# Patient Record
Sex: MALE | Race: WHITE | Employment: FULL TIME
[De-identification: names, ages, dates, MRNs, and addresses within clinical notes are randomized per-mention and may not be internally consistent; named-entity substitution may affect disease eponyms.]

---

## 2021-09-09 ENCOUNTER — TRANSCRIBE ORDERS (OUTPATIENT)
Dept: OTHER | Age: 46
End: 2021-09-09

## 2021-09-09 DIAGNOSIS — Z84.81 FHX: BRCA GENE POSITIVE: Primary | ICD-10-CM

## 2021-09-15 NOTE — PROGRESS NOTES
Action    Action Taken 9/15/2021 2:19pm LUCRECIA     I called pt Shan - his phone went to . I pulled his records into [facility].

## 2021-10-28 ENCOUNTER — TELEPHONE (OUTPATIENT)
Dept: ONCOLOGY | Facility: CLINIC | Age: 46
End: 2021-10-28

## 2021-10-28 NOTE — TELEPHONE ENCOUNTER
10/28/2021    I called Shan to ask about the familial BRCA mutation. Given that this was his referral, I called to ask if he could send me a copy of this relatives genetic testing report prior to his appointment tomorrow. He said that he had a stack of information available and that he would try to send me a copy of that later tonight.    He also asked if he needed a referral for the appointment. I informed him that technically he does not, however, some insurances may cover our appointment better if a referral is placed. I asked him to contact his PCP about a referral if he was interested in that. I will also contact the schedulers to see if there is anything else I can assist with on my end and for them to keep a look out for that referral to apply it to our visit. He had no further questions at this time.    Josh Reyna MS, [facility]  Licensed, Certified Genetic Counselor

## 2021-10-29 ENCOUNTER — VIRTUAL VISIT (OUTPATIENT)
Dept: ONCOLOGY | Facility: CLINIC | Age: 46
End: 2021-10-29
Attending: GENETIC COUNSELOR, MS
Payer: COMMERCIAL

## 2021-10-29 ENCOUNTER — PRE VISIT (OUTPATIENT)
Dept: ONCOLOGY | Facility: CLINIC | Age: 46
End: 2021-10-29

## 2021-10-29 DIAGNOSIS — Z80.42 FAMILY HISTORY OF MALIGNANT NEOPLASM OF PROSTATE: ICD-10-CM

## 2021-10-29 DIAGNOSIS — Z80.41 FAMILY HISTORY OF MALIGNANT NEOPLASM OF OVARY: ICD-10-CM

## 2021-10-29 DIAGNOSIS — Z80.3 FAMILY HISTORY OF MALIGNANT NEOPLASM OF BREAST: Primary | ICD-10-CM

## 2021-10-29 DIAGNOSIS — Z84.81 FHX: BRCA GENE POSITIVE: ICD-10-CM

## 2021-10-29 PROCEDURE — 96040 HC GENETIC COUNSELING, EACH 30 MINUTES: CPT | Mod: TEL,95 | Performed by: GENETIC COUNSELOR, MS

## 2021-10-29 NOTE — PROGRESS NOTES
10/29/2021    Referring Provider: Self-referred    Presenting Information:   I met with Shan for his phone genetic counseling visit, through the Cancer Risk Management Program, to discuss his family history of breast, ovarian, and prostate cancer along with the known BRCA2 mutation. Today we reviewed this history, cancer screening recommendations, and available genetic testing options.    Personal History:  Shan is a 46 year old year old male. He does not have any personal history of cancer. Shan has not had a colonoscopy. He does not regularly do any other cancer screening at this time.     Family History: (Please see scanned pedigree for detailed family history information)    Shan's father has a history of skin cancer on his face. He was also found to carry the familial BRCA2 mutation. Specifically, this mutation is [id number].    Shan's sister does not have cancer but she was found to have the familial BRCA2 mutation.    Her daughter (Shan's niece) does not have cancer but has the familial BRCA2 mutation.    Shan's mother was diagnosed with breast cancer in her 50's. She had genetic testing and was negative for 91 genes. A copy of this report was provided at our visit.    Shan's maternal grandmother  from an unknown cancer in her early 30's.    Shan's maternal grandfather was diagnosed with prostate cancer at an unknown age.    A paternal aunt was diagnosed with breast cancer at an unknown age.    A paternal cousin was diagnosed with breast cancer at age 31 and  at age 33. She was the first person identified to have the familial BRCA2 mutation in the family.    Her sister was diagnosed with an unknown cancer and also has the familial BRCA2 mutation.    Shan's paternal grandmother was diagnosed with breast cancer at age 59 and  at age 82.    Her sister (Shan's great aunt) was diagnosed with breast cancer in her 60's and  in her 80's.    His maternal ethnicity is Turkmen  and [place]. His paternal ethnicity is Occitan and Italian. There is no known Ashkenazi Pentecostal ancestry on either side of his family. There is no reported consanguinity.    Discussion:    We discussed the natural history and genetics of hereditary cancer. A detailed handout regarding hereditary cancer, along with the other information we discussed, will be mailed to Shan at the end of our appointment today and can be found in the after visit summary. Topics included: inheritance pattern, cancer risks, cancer screening recommendations, and also risks, benefits and limitations of testing.    Based on Shan's father's genetic test result, Shan has a 50% chance of also carrying the same genetic change in the BRCA2 gene.    Based on this, Shan meets the National Comprehensive Cancer Network (NCCN) criteria for genetic testing of the familial BRCA2 mutation.      Genetic testing is available for: BRCA2 site-specific testing for the known familial mutation [id number] or panel testing.    Given that his mother had negative comprehensive genetic testing, it is unlikely that there is a currently identifiable mutation in a cancer susceptibility gene from his maternal side of the family. Given that, Shan elected to pursue site specific genetic testing for the familial BRCA2 mutation.    Shan stated that he would prefer to submit a saliva kit for his genetic testing. [employer] will send a kit directly to his home with directions on how to collect a saliva sample. We discussed that there is a small chance for sample failure due to contamination of the sample. To help minimize this, he should follow the directions that are sent with the kit. Shan verbalized understanding of this. Once the sample is collected, he will send it to [employer] using the return envelope and prepaid shipping label.     Verbal consent was given over phone and written on the consent form. Turnaround time is approximately 4 weeks  once the lab receives the sample.    Medical Management: For Shan, we reviewed that the information from genetic testing may determine:    additional cancer screening for which Shan may qualify (i.e. increased breast and prostate screening, more frequent dermatologic exams, etc.),    and targeted chemotherapies for Shan he were to develop certain cancers in the future (i.e. PARP inhibitors, etc.).      These recommendations will be discussed in detail once genetic testing is completed.    Plan:  1) Today Shan elected to proceed with BRCA2 site-specific testing through [employer].  2) A copy of the consent form and the after visit summary will be mailed to Shan.  3) This information should be available in approximately 4 weeks, once the lab receives the sample.  4) I will call Shan with the results once they become available.    Time spent on the phone: 40 minutes    Josh Reyna MS, [facility] [place] [facility]  Licensed, Certified Genetic Counselor  (P): [phone]  (F): [phone]

## 2021-10-29 NOTE — Clinical Note
10/29/2021         RE: Shan Mas  [address] [place] [address]        Dear Colleague,    Thank you for referring your patient, Shan Mas, to the [facility]. Please see a copy of my visit note below.    10/29/2021    Referring Provider: ***    Presenting Information:   I met with Shan for his video genetic counseling visit, through the Cancer Risk Management Program, to discuss his personal *** and family history of *** cancer. Today we reviewed this history, cancer screening recommendations, and available genetic testing options.    Personal History:  Shan is a 46 year old year old male. He was diagnosed with ***; treatment included ***  / does not have any personal history of cancer.    ***She had her first menstrual period at age ***, her first child at age ***, and is ***.  ***Shan has her ovaries, fallopian tubes and uterus in place, and she has had *** ovarian cancer screening to date. She reports that she *** hormone replacement therapy.      She has *** clinical breast exams and mammograms; her most recent mammogram in *** was ***. ***Shan began having colonoscopies at the age of ***/Shan has not had a colonoscopy. His most recent colonoscopy in *** was *** and follow-up was recommended in ***. ***He does not regularly do any other cancer screening at this time. Shan reported *** tobacco use and *** alcohol use.    Family History: (Please see scanned pedigree for detailed family history information)    ***    ***    His maternal ethnicity is ***. His paternal ethnicity is ***. There is no known Ashkenazi Voodoo ancestry on either side of his family. There is no reported consanguinity.    Discussion:    Shan's personal and family history of *** is suggestive of a hereditary cancer syndrome.    We reviewed the features of sporadic, familial, and hereditary cancers. ***    We discussed the natural history and genetics of hereditary cancer. A detailed  handout regarding hereditary cancer, along with the other information we discussed, will be mailed to Shan at the end of our appointment today and can be found in the after visit summary. Topics included: inheritance pattern, cancer risks, cancer screening recommendations, and also risks, benefits and limitations of testing.    Based on his personal and family history, Shan meets current National Comprehensive Cancer Network (NCCN) criteria for genetic testing of ***.    We discussed that there are additional genes that could cause increased risk for *** cancer. As many of these genes present with overlapping features in a family and accurate cancer risk cannot always be established based upon the pedigree analysis alone, it would be reasonable for Shan to consider panel genetic testing to analyze multiple genes at once.    ***    Shan stated that she would prefer to submit a saliva kit for her genetic testing. ***[employer] will send a kit directly to his home with directions on how to collect a saliva sample. We discussed that there is a small chance for sample failure due to contamination of the sample. To help minimize this, he should follow the directions that are sent with the kit. Shan verbalized understanding of this. Once the sample is collected, he will send it to ***[employer] using the return envelope and prepaid shipping label.     Verbal consent was given over video*** and written on the consent form due to COVID-19 restrictions. Turnaround time is approximately 4 weeks once the lab receives the sample.    Medical Management: For Shan, we reviewed that the information from genetic testing may determine:    ***surgery to treat Shan's active cancer diagnosis (i.e. lumpectomy versus bilateral mastectomy, partial versus total colectomy, etc.***),    additional cancer screening for which Shan may qualify (i.e. mammogram and breast MRI, more frequent colonoscopies, more frequent dermatologic exams,  etc.***),    options for risk reducing surgeries Shan could consider (i.e. bilateral mastectomy, surgery to remove her ovaries and/or uterus, etc.***),      and targeted chemotherapies for Shan's *** active cancer, or ***if he were to develop certain cancers in the future (i.e. immunotherapy for individuals with Leo syndrome, PARP inhibitors, etc.***).     These recommendations and possible targeted chemotherapies will be discussed in detail once genetic testing is completed.     Plan:  1) Today Shan elected to proceed with ***.  2) A copy of the consent form and the after visit summary will be mailed to Shan.  3) This information should be available in approximately 4 weeks, once the lab receives the sample.  4) I will call Shan with the results once they become available.    Time spent on video: 40 minutes    Josh Reyna MS, [facility]  Licensed, Certified Genetic Counselor  (P): [phone]  (F): [phone]    ***      Again, thank you for allowing me to participate in the care of your patient.        Sincerely,        Josh Reyna, GC

## 2021-10-29 NOTE — LETTER
Cancer Risk Management  Program Locations    [facility]  [facility]  [facility]  [facility]  [facility]  Mailing Address  Cancer Risk Management Program  [facility]  [address]  [place] [address]    New patient appointments  [phone]  2021    Shan Mas  [address] [place] [address]    Dear Shan,    It was a pleasure speaking with you on the phone on 10/29/2021. Here is a copy of the progress note from our discussion. If you have any additional questions, please feel free to call.    Referring Provider: Self-referred    Presenting Information:   I met with Shan for his phone genetic counseling visit, through the Cancer Risk Management Program, to discuss his family history of breast, ovarian, and prostate cancer along with the known BRCA2 mutation. Today we reviewed this history, cancer screening recommendations, and available genetic testing options.    Personal History:  Shan is a 46 year old year old male. He does not have any personal history of cancer. Shan has not had a colonoscopy. He does not regularly do any other cancer screening at this time.     Family History: (Please see scanned pedigree for detailed family history information)    Shan's father has a history of skin cancer on his face. He was also found to carry the familial BRCA2 mutation. Specifically, this mutation is [id number].    Shan's sister does not have cancer but she was found to have the familial BRCA2 mutation.    Her daughter (Shan's niece) does not have cancer but has the familial BRCA2 mutation.    Shan's mother was diagnosed with breast cancer in her 50's. She had genetic testing and was negative for 91 genes. A copy of this report was provided at our visit.    Shan's maternal grandmother  from an unknown cancer in her  early 30's.    Shan's maternal grandfather was diagnosed with prostate cancer at an unknown age.    A paternal aunt was diagnosed with breast cancer at an unknown age.    A paternal cousin was diagnosed with breast cancer at age 31 and  at age 33. She was the first person identified to have the familial BRCA2 mutation in the family.    Her sister was diagnosed with an unknown cancer and also has the familial BRCA2 mutation.    Shan's paternal grandmother was diagnosed with breast cancer at age 59 and  at age 82.    Her sister (Shan's great aunt) was diagnosed with breast cancer in her 60's and  in her 80's.    His maternal ethnicity is Indian and Frisian. His paternal ethnicity is [place] Islander and Indian. There is no known Ashkenazi Nondenominational ancestry on either side of his family. There is no reported consanguinity.    Discussion:    We discussed the natural history and genetics of hereditary cancer. A detailed handout regarding hereditary cancer, along with the other information we discussed, will be mailed to Shan at the end of our appointment today and can be found in the after visit summary. Topics included: inheritance pattern, cancer risks, cancer screening recommendations, and also risks, benefits and limitations of testing.    Based on Shan's father's genetic test result, Shan has a 50% chance of also carrying the same genetic change in the BRCA2 gene.    Based on this, Shan meets the National Comprehensive Cancer Network (NCCN) criteria for genetic testing of the familial BRCA2 mutation.      Genetic testing is available for: BRCA2 site-specific testing for the known familial mutation [id number]_[id number] or panel testing.    Given that his mother had negative comprehensive genetic testing, it is unlikely that there is a currently identifiable mutation in a cancer susceptibility gene from his maternal side of the family. Given that, Shan elected to pursue site specific genetic testing for the  familial BRCA2 mutation.    Shan stated that he would prefer to submit a saliva kit for his genetic testing. [employer] will send a kit directly to his home with directions on how to collect a saliva sample. We discussed that there is a small chance for sample failure due to contamination of the sample. To help minimize this, he should follow the directions that are sent with the kit. Shan verbalized understanding of this. Once the sample is collected, he will send it to [employer] using the return envelope and prepaid shipping label.     Verbal consent was given over phone and written on the consent form. Turnaround time is approximately 4 weeks once the lab receives the sample.    Medical Management: For Shan, we reviewed that the information from genetic testing may determine:    additional cancer screening for which Shan may qualify (i.e. increased breast and prostate screening, more frequent dermatologic exams, etc.),    and targeted chemotherapies for Shan he were to develop certain cancers in the future (i.e. PARP inhibitors, etc.).      These recommendations will be discussed in detail once genetic testing is completed.    Plan:  1) Today Shan elected to proceed with BRCA2 site-specific testing through [email or web address].  2) A copy of the consent form and the after visit summary will be mailed to Shan.  3) This information should be available in approximately 4 weeks, once the lab receives the sample.  4) I will call Shan with the results once they become available.    Time spent on the phone: 40 minutes      Josh Reyna MS, [facility]  Licensed, Certified Genetic Counselor  (P): [phone]  (F): [phone]

## 2021-11-30 NOTE — PATIENT INSTRUCTIONS
Assessing Cancer Risk  Only about 5-10% of cancers are thought to be due to an inherited cancer susceptibility gene.    These families often have:    Several people with the same or related types of cancer    Cancers diagnosed at a young age (before age 50)    Individuals with more than one primary cancer    Multiple generations of the family affected with cancer    BRCA1 and BRCA2 Genes  We each inherit two copies of every gene in our bodies: one from our mother, and one from our father.  Each gene has a specific job to do.  When a gene has a mistake or  mutation  in it, it does not work like it should.  Everyone has two copies of BRCA1 and two copies of BRCA2.  A single mutation in one of the copies of BRCA1 or BRCA2 increases the risk for breast and ovarian cancer, among others.  The risk for pancreatic cancer and melanoma may also be slightly increased in some families.  The tables below list the chance that someone with a BRCA mutation would develop cancer in his or her lifetime1,2,3,4.      Women   Men    General Population BRCA +   General Population BRCA +   Breast 12% 40-85%  Breast <1% ~7%   Ovarian 1-2% 10-40%  Prostate 16% 20%     Inheriting a mutation does not mean a person will develop cancer, but it does significantly increase his or her risk above the general population risk.    A person s ethnic background is also important to consider, as individuals of Ashkenazi Uatsdin ancestry have a higher chance of having a BRCA gene mutation.  There are three BRCA mutations that occur more frequently in this population.     Genetic Testing  Genetic testing involves a simple blood test and will look at the genetic information in the BRCA1 and BRCA2 genes for any harmful mutations that are associated with increased cancer risk.  If possible, it is recommended that the person(s) who has had cancer be tested before other family members.  That person will give us the most useful information about whether or not  a specific gene is associated with the cancer in the family.     Results  There are three possible results of BRCA1 and BRCA2 genetic testing:    Positive--a harmful mutation was identified    Negative--no mutation was identified    Variant of unknown significance--a variation in one of the genes was identified, but it is unclear how this impacts cancer risk in the family    Advantages and Disadvantages  There are advantages and disadvantages to genetic testing of these genes.    Advantages    May clarify your cancer risk    Can help you make medical decisions    May explain the cancers in your family    May give useful information to your family members (if you share your results)    Disadvantages    Possible negative emotional impact of learning about inherited cancer risk    Uncertainty in interpreting a negative test result in some situations    Possible genetic discrimination concerns (see below)    Inheritance   BRCA1 and BRCA2 mutations are inherited in an autosomal dominant pattern.  This means that if a parent has a mutation, each of his or her children will have a 50% chance of inheriting that same mutation.  Therefore, each child--male or female--would have a 50% chance of being at increased risk for developing cancer.                                              Image obtained from Genetics Home Reference, 2013     Genetic Information Nondiscrimination Act (VEENA)  VEENA is a federal law that protects individuals from health insurance or employment discrimination based on a genetic test result alone.  Although rare, there are currently no legal protections in terms of life insurance, long term care, or disability insurances.  Visit the National Human Genome Research Lo[place] at Genome.gov/97508586 to learn more.    Reducing Cancer Risk  Current screening recommendations for women with a BRCA mutation include1:    Breast:  o Breast awareness starting at age 18  o Clinical breast exams starting at age 25;  repeated every 6-12 months  o Annual breast MRI starting at age 25 (or possibly younger)  o Annual mammogram (consider tomosynthesis) and breast MRI at age 30 (for women with and without a breast cancer history)  o Consideration of preventative mastectomy    Ovarian:   o Consideration of transvaginal ultrasound and CA-125 blood test starting at age 30 (or possibly younger); repeated every 6 months  o Recommendation for surgery to remove the ovaries and fallopian tubes after child bearing or by age 35-40. Women with BRCA2 mutations may delay this surgery until ages 40-45, unless it is warranted to consider sooner based on family history.    Some data suggests that women with BRCA1 mutations may be at slightly higher risk for serous uterine cancer. Information is limited at this time, and further research is needed to better understand this risk. Women with BRCA1 mutations should discuss the risks and benefits of hysterectomy at the time of ovary removal.     Current screening recommendations for men with a BRCA mutation include1:    Breast:  o Self-breast exams starting at age 35  o Annual clinical breast exams starting at age 35    Prostate:   o Recommend prostate cancer screening starting at age 45 for BRCA2 carriers  o Consider prostate cancer screening starting at age 45 for BRCA1 carriers    Both men and women with BRCA mutations should talk to their doctor or genetic counselor about screening for pancreatic cancer and melanoma.  In addition, the age at which to start screening may be modified based on family history of young cancer.    Questions to Think About Regarding Genetic Testing    What effect will the test result have on me and my relationship with my family members if I have an inherited gene mutation?  If I don t have a gene mutation?    Should I share my test results, and how will my family react to this news, which may also affect them?    Are my children ready to learn new information that may one  day affect their own health?    Resources  FORCE: Facing Our Risk of Cancer Empowered facingourrisk.org   Bright Pink bebrightpink.org   American Cancer Society (ACS) cancer.org   National Cancer [place] (NCI) cancer.gov     Please call us if you have any questions or concerns.   Cancer Risk Management Program [phone] ([phone])  ? Josh Reyna, MS [facility] [phone]  ? Laura Cervantes, MS, [facility]  [phone]  ? Claribel Agustin, MS, [facility]  [phone]  ? Gina Wyatt, MS, [facility] [phone]  ? Glenda Ba, MS, [facility] [phone]  ? Prerna Call, MS, [facility]  [phone]  ? Swetha Wilson, MS  [phone]    References:  1. National Comprehensive Cancer Network. Clinical practice guidelines in oncology, colorectal cancer screening. Available online (registration required). 2019.

## 2021-12-09 ENCOUNTER — VIRTUAL VISIT (OUTPATIENT)
Dept: ONCOLOGY | Facility: CLINIC | Age: 46
End: 2021-12-09
Attending: GENETIC COUNSELOR, MS
Payer: COMMERCIAL

## 2021-12-09 DIAGNOSIS — Z80.41 FAMILY HISTORY OF MALIGNANT NEOPLASM OF OVARY: ICD-10-CM

## 2021-12-09 DIAGNOSIS — Z15.03 BRCA2 GENE MUTATION POSITIVE IN MALE: Primary | ICD-10-CM

## 2021-12-09 DIAGNOSIS — Z15.01 BRCA2 GENE MUTATION POSITIVE IN MALE: Primary | ICD-10-CM

## 2021-12-09 DIAGNOSIS — Z80.3 FAMILY HISTORY OF MALIGNANT NEOPLASM OF BREAST: ICD-10-CM

## 2021-12-09 DIAGNOSIS — Z80.42 FAMILY HISTORY OF MALIGNANT NEOPLASM OF PROSTATE: ICD-10-CM

## 2021-12-09 DIAGNOSIS — Z15.09 BRCA2 GENE MUTATION POSITIVE IN MALE: Primary | ICD-10-CM

## 2021-12-09 DIAGNOSIS — Z84.81 FAMILY HISTORY OF CARRIER OF GENETIC DISEASE: ICD-10-CM

## 2021-12-09 PROCEDURE — 999N000069 HC STATISTIC GENETIC COUNSELING, < 16 MIN: Mod: TEL | Performed by: GENETIC COUNSELOR, MS

## 2021-12-09 NOTE — Clinical Note
"Hello,    Please enclose a copy of the test report from the laboratory tab titled \"laboratory miscellaneous order\" dated 11/6/21 (Order [id number]) to send to the patient along with the letter.    Thank you,  Josh"

## 2021-12-09 NOTE — LETTER
"    Cancer Risk Management  Program Locations    [facility]  [facility]  [facility]  [facility]  Mailing Address  Cancer Risk Management Program  [facility]  [address]  [place] [address]    New patient appointments  [phone]  December 9, 2021    Shan Mas  [address] [place] [address]      Dear Shan,    It was a pleasure speaking with you on the phone on 12/9/2021. Here is a copy of the progress note from our discussion. If you have any additional questions, please feel free to call.    Referring Provider: Ty Villagomez MD    Presenting Information:   I spoke to Shan by phone today to discuss his genetic testing results. A saliva kit was sent to his house on 11/1/21. BRCA2 site-specific testing was ordered from [facility]. This testing was done because of Shan's family history of breast, ovarian, and prostate cancer along with the known BRCA2 mutation.     Genetic Testing Results: POSITIVE  Shan is POSITIVE for a BRCA2 mutation. Specifically his mutation is called [id number]. We discussed that this mutation is associated with a diagnosis of Hereditary Breast and Ovarian Cancer (HBOC) syndrome and increased risk for cancer. We discussed the impact of this testing on Shan in detail. This test only looked at this one mutation.    A copy of the test report can be found in the Laboratory tab, dated 11/06/21, and named \"LABORATORY MISCELLANEOUS ORDER\". The report is scanned in as a linked document.    BRCA2 Cancer Risks:  Men with mutations in BRCA2 have a:      Increased lifetime prostate cancer risk, including a higher likelihood for advanced or metastatic disease.     7-8% lifetime male breast cancer risk. This is greater than the general population risk of 0.1%.    Women with mutations in BRCA2 have " a:    60-80% lifetime risk of developing breast cancer. This is much greater than the general population breast cancer risk of 12%    13-29% lifetime risk of developing ovarian cancer.  This is much greater than the general population ovarian cancer risk of 1-2%    Men and Women with mutations in BRCA2 have a:    5-10% lifetime risk of developing pancreatic cancer.    Increased lifetime risk of melanoma.     Also, though no exact lifetime risks are available at this time, there may be increased risks for other cancers.     Cancer Screening and Prevention:  The following screening is recommended for men who have a mutation in the BRCA2 gene:    Breast self-exam starting at age 35    Annual clinical breast exams starting at age 35    Consider annual mammogram screening in men with gynecomastia starting at age 50 or 10 years prior to the earliest male breast cancer in the family    Prostate cancer screening, prostate specific antigen (PSA) test and digital rectal exam starting at age 40. Follow up based on NCCN guidelines per patient results.    The following screening is recommended for women who have a mutation in the BRCA2 gene, per current National Comprehensive Cancer Network (NCCN) guidelines.    Breast awareness starting at age 18    Clinical breast exams starting at age 25; repeated every 6-12 months    Annual breast MRI from age 25-29    Annual mammograms with consideration of tomosynthesis and breast MRI alternating every 6 months starting at age 30    Consider transvaginal ultrasound and a  blood test starting at age 30-35, though the benefits of this screening are unclear.    We discussed that prophylactic mastectomy is a surgical option, which reduces breast cancer risk by 90%. Chemoprevention with Tamoxifen can reduce breast cancer risk in some women. Surgical risk reduction options and Tamoxifen use should be discussed with Shan's physician.      We discussed the limitations of screening for  ovarian cancer. Thus, prophylactic removal of the ovaries and fallopian tubes is an option and is recommended after women are finished planning their families or between 35 and 40. Of note, per current NCCN guidelines, women with a BRCA2 mutation may choose to delay this surgery to ages 40-45. For some patients with a BRCA2 mutation, though, surgery can be considered at an earlier age, based on family history. As with any surgery, risks are involved, and this option should be discussed in greater detail with a gyn-oncologist.      We discussed that using oral contraceptives for five years or more has been shown to reduce ovarian cancer risk by around 50%. The data are still inconclusive as to whether or not using oral contraceptives will increase breast cancer risk in BRCA2 mutation carriers.       Screening for pancreatic cancer is not offered on a routine basis, as the benefits of current screening methods are unknown. Pancreatic cancer screening may be considered based on family history, though. Screening for melanoma may also be considered.    Some chemotherapies for certain cancers may be more effective in individuals with BRCA2 mutations. Shan should discuss this with his physicians, if chemotherapy is indicated for him in the future.     Other screening based on Shan's personal and family history:    Other population cancer screening options, such as those recommended by the American Cancer Society and the National Comprehensive Cancer Network (NCCN), are also appropriate for Shan and his family. These screening recommendations may change if there are changes to Shan's personal and/or family history of cancer. Final screening recommendations should be made by each individual's primary care provider.    We discussed that Shan could participate in our Cancer Risk Management Program in which our nursing specialist provides an individual screening plan and assists with medical management. Shan  declined this at this time. I encouraged him to contact me should he become interested in this in the future.    Of note, the above information is based on our current understanding of Shan's genetic findings. Shan is encouraged to reach out to me regularly regarding any pertinent updates to his personal and/or family history of cancer, as our understanding of the genetic findings in his family may change over time.     Implications for Family Members:  We reviewed that mutations in the BRCA2 gene are inherited in an autosomal dominant pattern. This means that Shan's son has a 50% chance of inheriting the same mutation. Likewise, each of his siblings has a 50% risk of having the same mutation. I am happy to help his relatives connect with a genetic counselor in their area if they would like to discuss testing.    In rare situations in which both parents have a mutation in the BRCA2 gene, their children each have a 25% risk for Fanconi anemia. Fanconi anemia is a rare condition with onset in childhood that often results in physical abnormalities, growth retardation, bone marrow failure, and increased risk for cancer. For individuals of childbearing age with BRCA2 mutations, genetic counseling and genetic testing may be advised for their partners.    Additional Testing Considerations:  Even though Shan's genetic testing result was positive, other relatives may carry the familial BRCA2 mutation or a different gene mutation associated with breast, ovarian, and/or prostate cancer. Genetic counseling is recommended for other close paternal relatives, such as his other sister, to discuss genetic testing options. If any of these relatives do pursue genetic testing, Shan is encouraged to contact me so that we may review the impact of their test results on him.                Resources for Men with a BRCA1 or BRCA2 Mutation    FORCE: Facing Our Risk of Cancer Empowered  www.facingour[place].org  FORCE s mission is to  improve the lives of individuals and families affected by hereditary breast, ovarian, and related cancers by creating awareness, supplying information and support to the community, advocating for and supporting research, and working with the research and medical communities.     Helpline: 1-923.155.8407    Message boards    Peer Navigation and local support groups    Information for BRCA+ Men: http://www.facingourrisk.org/understanding-brca-and-hboc/information/previvors-survivors/men/basics/overview.php   Talking About BRCA in Your Family Tree  http://www.facingSocius.org/understanding-brca-and-hboc/publications/documents/wdrpvnc-eiepkco-zzwsl-brca-family.pdf  Prostate Cancer Foundation www.pcf.org/   The Prostate Cancer Foundation was started in 2003. This organization funds research for prevention, detection, and treatment, and hopefully one day a cure for prostate cancer.   Us [employer] www.ustoo.org  UsTOO was started by men in [place] in 1990. This is now an international effort to provide education and support to men diagnosed with prostate cancer.  This website also has a prostate cancer support group search function to locate a support group near you.  Pancreatic Cancer Action Network www.pancan.org  The Pancreatic Cancer Action Network aims to fight pancreatic cancer, with a goal of doubling survival by 2020. This organization hosts  SERPs a walk to support pancreatic cancer research. There are also ways to get involved with this organization through volunteering and advocacy work.    BRCA Information Support Group  People with BRCA1 or BRAC2 mutations face complex emotional challenges and complicated medical decisions due to high cancer risks and their impact on individuals and families. This group brings people with a BRAC1 or BRAC2 genetic diagnosis together with others facing similar challenges. The group meets nine times per year. For information, please contact [email or web address] or  call [phone].  [facility] [place] www.francinesclubtwincities.org  [facility] [place] is a 501c)3 nonprofit and the local affiliate of the Cancer Support Community, a network of more than 54 supportive, free and welcoming  clubhouses  where everyone living with cancer can come for social, emotional and psychological support. Clubs are healing environments where individuals learn from each other with guidance from licensed professionals.  Books:  Confronting Hereditary Breast and Ovarian Cancer: Identify Your Risk, Understand Your Options, Change Your Jessie, Milvia Dubon    Plan:  1.  A copy of the test results will be mailed to Shan.  2.  He plans to follow up with his other providers.  3. If Shan has additional questions, I encouraged him to contact me directly at [phone].     Time spent on the phone: 10 minutes    Josh Reyna MS, [facility]  Licensed, Certified Genetic Counselor  (P): [phone]  (F): [phone]

## 2021-12-13 NOTE — PROGRESS NOTES
"12/9/2021    Referring Provider: Ty Villagomez MD    Presenting Information:   I spoke to Shan by phone today to discuss his genetic testing results. A saliva kit was sent to his house on 11/1/21. BRCA2 site-specific testing was ordered from [facility]. This testing was done because of Shan's family history of breast, ovarian, and prostate cancer along with the known BRCA2 mutation.     Genetic Testing Results: POSITIVE  Shan is POSITIVE for a BRCA2 mutation. Specifically his mutation is called [id number]. We discussed that this mutation is associated with a diagnosis of Hereditary Breast and Ovarian Cancer (HBOC) syndrome and increased risk for cancer. We discussed the impact of this testing on Shan in detail. This test only looked at this one mutation.    A copy of the test report can be found in the Laboratory tab, dated 11/06/21, and named \"LABORATORY MISCELLANEOUS ORDER\". The report is scanned in as a linked document.    BRCA2 Cancer Risks:  Men with mutations in BRCA2 have a:      Increased lifetime prostate cancer risk, including a higher likelihood for advanced or metastatic disease.     7-8% lifetime male breast cancer risk. This is greater than the general population risk of 0.1%.    Women with mutations in BRCA2 have a:    60-80% lifetime risk of developing breast cancer. This is much greater than the general population breast cancer risk of 12%    13-29% lifetime risk of developing ovarian cancer.  This is much greater than the general population ovarian cancer risk of 1-2%    Men and Women with mutations in BRCA2 have a:    5-10% lifetime risk of developing pancreatic cancer.    Increased lifetime risk of melanoma.     Also, though no exact lifetime risks are available at this time, there may be increased risks for other cancers.     Cancer Screening and Prevention:  The following screening is recommended for men who have a mutation in the BRCA2 gene:    Breast self-exam starting at " age 35    Annual clinical breast exams starting at age 35    Consider annual mammogram screening in men with gynecomastia starting at age 50 or 10 years prior to the earliest male breast cancer in the family    Prostate cancer screening, prostate specific antigen (PSA) test and digital rectal exam starting at age 40. Follow up based on NCCN guidelines per patient results.    The following screening is recommended for women who have a mutation in the BRCA2 gene, per current National Comprehensive Cancer Network (NCCN) guidelines.    Breast awareness starting at age 18    Clinical breast exams starting at age 25; repeated every 6-12 months    Annual breast MRI from age 25-29    Annual mammograms with consideration of tomosynthesis and breast MRI alternating every 6 months starting at age 30    Consider transvaginal ultrasound and a  blood test starting at age 30-35, though the benefits of this screening are unclear.    We discussed that prophylactic mastectomy is a surgical option, which reduces breast cancer risk by 90%. Chemoprevention with Tamoxifen can reduce breast cancer risk in some women. Surgical risk reduction options and Tamoxifen use should be discussed with Shan's physician.      We discussed the limitations of screening for ovarian cancer. Thus, prophylactic removal of the ovaries and fallopian tubes is an option and is recommended after women are finished planning their families or between 35 and 40. Of note, per current NCCN guidelines, women with a BRCA2 mutation may choose to delay this surgery to ages 40-45. For some patients with a BRCA2 mutation, though, surgery can be considered at an earlier age, based on family history. As with any surgery, risks are involved, and this option should be discussed in greater detail with a gyn-oncologist.      We discussed that using oral contraceptives for five years or more has been shown to reduce ovarian cancer risk by around 50%. The data are still  inconclusive as to whether or not using oral contraceptives will increase breast cancer risk in BRCA2 mutation carriers.     Screening for pancreatic cancer is not offered on a routine basis, as the benefits of current screening methods are unknown. Pancreatic cancer screening may be considered based on family history, though. Screening for melanoma may also be considered.    Some chemotherapies for certain cancers may be more effective in individuals with BRCA2 mutations. Shan should discuss this with his physicians, if chemotherapy is indicated for him in the future.     Other screening based on Shan's personal and family history:    Other population cancer screening options, such as those recommended by the American Cancer Society and the National Comprehensive Cancer Network (NCCN), are also appropriate for Shan and his family. These screening recommendations may change if there are changes to Shan's personal and/or family history of cancer. Final screening recommendations should be made by each individual's primary care provider.    We discussed that Shan could participate in our Cancer Risk Management Program in which our nursing specialist provides an individual screening plan and assists with medical management. Shan declined this at this time. I encouraged him to contact me should he become interested in this in the future.    Of note, the above information is based on our current understanding of Shan's genetic findings. Shan is encouraged to reach out to me regularly regarding any pertinent updates to his personal and/or family history of cancer, as our understanding of the genetic findings in his family may change over time.     Implications for Family Members:  We reviewed that mutations in the BRCA2 gene are inherited in an autosomal dominant pattern. This means that Shan's son has a 50% chance of inheriting the same mutation. Likewise, each of his siblings has a 50% risk of having  the same mutation. I am happy to help his relatives connect with a genetic counselor in their area if they would like to discuss testing.    In rare situations in which both parents have a mutation in the BRCA2 gene, their children each have a 25% risk for Fanconi anemia. Fanconi anemia is a rare condition with onset in childhood that often results in physical abnormalities, growth retardation, bone marrow failure, and increased risk for cancer. For individuals of childbearing age with BRCA2 mutations, genetic counseling and genetic testing may be advised for their partners.    Additional Testing Considerations:  Even though Shan's genetic testing result was positive, other relatives may carry the familial BRCA2 mutation or a different gene mutation associated with breast, ovarian, and/or prostate cancer. Genetic counseling is recommended for other close paternal relatives, such as his other sister, to discuss genetic testing options. If any of these relatives do pursue genetic testing, Shan is encouraged to contact me so that we may review the impact of their test results on him.    Resources for Men with a BRCA1 or BRCA2 Mutation    FORCE: Facing Our Risk of Cancer Empowered  www.Invenergy.org  FORCE s mission is to improve the lives of individuals and families affected by hereditary breast, ovarian, and related cancers by creating awareness, supplying information and support to the community, advocating for and supporting research, and working with the research and medical communities.     Helpline: 1-692.381.3090    Message boards    Peer Navigation and local support groups    Information for BRCA+ Men: http://www.facingPolitapoll.org/understanding-brca-and-hboc/information/previvors-survivors/men/basics/overview.php   Talking About BRCA in Your Family Tree  http://www.facingPolitapoll.org/understanding-brca-and-hboc/publications/documents/vgjmghj-kioumyh-qwuiv-brca-family.pdf  Prostate Cancer Foundation  www.pcf.org/   The Prostate Cancer Foundation was started in 2003. This organization funds research for prevention, detection, and treatment, and hopefully one day a cure for prostate cancer.   Us TOO www.ustoo.org  UsTOO was started by men in [place] in 1990. This is now an international effort to provide education and support to men diagnosed with prostate cancer.  This website also has a prostate cancer support group search function to locate a support group near you.  Pancreatic Cancer Action Network www.pancan.org  The Pancreatic Cancer Action Network aims to fight pancreatic cancer, with a goal of doubling survival by 2020. This organization hosts  [place] to support pancreatic cancer research. There are also ways to get involved with this organization through volunteering and advocacy work.    BRCA Information Support Group  People with BRCA1 or BRAC2 mutations face complex emotional challenges and complicated medical decisions due to high cancer risks and their impact on individuals and families. This group brings people with a BRAC1 or BRAC2 genetic diagnosis together with others facing similar challenges. The group meets nine times per year. For information, please contact [email or web address] or call [phone].  [employer] www.Copyright Agentsclubtwincities.org  [facility] [place] is a 501(c)3 nonprofit and the local affiliate of the Cancer Support Community, a network of more than 54 supportive, free and welcoming  clubhouses  where everyone living with cancer can come for social, emotional and psychological support. Clubs are healing environments where individuals learn from each other with guidance from licensed professionals.  Books:  Confronting Hereditary Breast and Ovarian Cancer: Identify Your Risk, Understand Your Options, Change Your Jessie, Milvia Dubon    Plan:  1.  A copy of the test results will be mailed to Shan.  2.  He plans to follow up with his  other providers.  3. If Shan has additional questions, I encouraged him to contact me directly at [phone].     Time spent on the phone: 10 minutes    Josh Reyna MS, [facility]  Licensed, Certified Genetic Counselor  (P): [phone]  (F): [phone]